# Patient Record
Sex: MALE | Race: ASIAN | NOT HISPANIC OR LATINO | URBAN - METROPOLITAN AREA
[De-identification: names, ages, dates, MRNs, and addresses within clinical notes are randomized per-mention and may not be internally consistent; named-entity substitution may affect disease eponyms.]

---

## 2024-09-04 ENCOUNTER — INPATIENT (INPATIENT)
Facility: HOSPITAL | Age: 64
LOS: 0 days | Discharge: LEFT BEFORE DISCHARGE | DRG: 316 | End: 2024-09-05
Attending: HOSPITALIST | Admitting: HOSPITALIST
Payer: COMMERCIAL

## 2024-09-04 VITALS
DIASTOLIC BLOOD PRESSURE: 81 MMHG | TEMPERATURE: 98 F | OXYGEN SATURATION: 100 % | HEART RATE: 70 BPM | SYSTOLIC BLOOD PRESSURE: 130 MMHG | HEIGHT: 71 IN | WEIGHT: 173.5 LBS | RESPIRATION RATE: 18 BRPM

## 2024-09-04 PROCEDURE — 99053 MED SERV 10PM-8AM 24 HR FAC: CPT

## 2024-09-04 PROCEDURE — 99285 EMERGENCY DEPT VISIT HI MDM: CPT

## 2024-09-04 NOTE — ED ADULT TRIAGE NOTE - CHIEF COMPLAINT QUOTE
pacemaker placed 6 years ago in [place]; comes in today with device protruding through skin left side of chest; pt noted today; no pain

## 2024-09-05 VITALS
TEMPERATURE: 98 F | OXYGEN SATURATION: 98 % | RESPIRATION RATE: 16 BRPM | DIASTOLIC BLOOD PRESSURE: 84 MMHG | SYSTOLIC BLOOD PRESSURE: 121 MMHG | HEART RATE: 56 BPM

## 2024-09-05 DIAGNOSIS — T82.9XXA UNSPECIFIED COMPLICATION OF CARDIAC AND VASCULAR PROSTHETIC DEVICE, IMPLANT AND GRAFT, INITIAL ENCOUNTER: ICD-10-CM

## 2024-09-05 LAB
ALBUMIN SERPL ELPH-MCNC: 4.4 G/DL — SIGNIFICANT CHANGE UP (ref 3.3–5)
ALP SERPL-CCNC: 76 U/L — SIGNIFICANT CHANGE UP (ref 40–120)
ALT FLD-CCNC: 16 U/L — SIGNIFICANT CHANGE UP (ref 10–45)
ANION GAP SERPL CALC-SCNC: 14 MMOL/L — SIGNIFICANT CHANGE UP (ref 5–17)
APTT BLD: 31.5 SEC — SIGNIFICANT CHANGE UP (ref 24.5–35.6)
AST SERPL-CCNC: 24 U/L — SIGNIFICANT CHANGE UP (ref 10–40)
BASOPHILS # BLD AUTO: 0.07 K/UL — SIGNIFICANT CHANGE UP (ref 0–0.2)
BASOPHILS NFR BLD AUTO: 0.8 % — SIGNIFICANT CHANGE UP (ref 0–2)
BILIRUB SERPL-MCNC: 0.3 MG/DL — SIGNIFICANT CHANGE UP (ref 0.2–1.2)
BLD GP AB SCN SERPL QL: NEGATIVE — SIGNIFICANT CHANGE UP
BUN SERPL-MCNC: 23 MG/DL — SIGNIFICANT CHANGE UP (ref 7–23)
CALCIUM SERPL-MCNC: 9.6 MG/DL — SIGNIFICANT CHANGE UP (ref 8.4–10.5)
CHLORIDE SERPL-SCNC: 104 MMOL/L — SIGNIFICANT CHANGE UP (ref 96–108)
CO2 SERPL-SCNC: 18 MMOL/L — LOW (ref 22–31)
CREAT SERPL-MCNC: 0.96 MG/DL — SIGNIFICANT CHANGE UP (ref 0.5–1.3)
EGFR: 89 ML/MIN/1.73M2 — SIGNIFICANT CHANGE UP
EOSINOPHIL # BLD AUTO: 0.23 K/UL — SIGNIFICANT CHANGE UP (ref 0–0.5)
EOSINOPHIL NFR BLD AUTO: 2.7 % — SIGNIFICANT CHANGE UP (ref 0–6)
GAS PNL BLDV: SIGNIFICANT CHANGE UP
GLUCOSE SERPL-MCNC: 119 MG/DL — HIGH (ref 70–99)
HCT VFR BLD CALC: 42.6 % — SIGNIFICANT CHANGE UP (ref 39–50)
HGB BLD-MCNC: 13.9 G/DL — SIGNIFICANT CHANGE UP (ref 13–17)
IMM GRANULOCYTES NFR BLD AUTO: 0.1 % — SIGNIFICANT CHANGE UP (ref 0–0.9)
INR BLD: 1.08 RATIO — SIGNIFICANT CHANGE UP (ref 0.85–1.18)
LYMPHOCYTES # BLD AUTO: 2.63 K/UL — SIGNIFICANT CHANGE UP (ref 1–3.3)
LYMPHOCYTES # BLD AUTO: 30.9 % — SIGNIFICANT CHANGE UP (ref 13–44)
MCHC RBC-ENTMCNC: 28.5 PG — SIGNIFICANT CHANGE UP (ref 27–34)
MCHC RBC-ENTMCNC: 32.6 GM/DL — SIGNIFICANT CHANGE UP (ref 32–36)
MCV RBC AUTO: 87.5 FL — SIGNIFICANT CHANGE UP (ref 80–100)
MONOCYTES # BLD AUTO: 1.13 K/UL — HIGH (ref 0–0.9)
MONOCYTES NFR BLD AUTO: 13.3 % — SIGNIFICANT CHANGE UP (ref 2–14)
NEUTROPHILS # BLD AUTO: 4.43 K/UL — SIGNIFICANT CHANGE UP (ref 1.8–7.4)
NEUTROPHILS NFR BLD AUTO: 52.2 % — SIGNIFICANT CHANGE UP (ref 43–77)
NRBC # BLD: 0 /100 WBCS — SIGNIFICANT CHANGE UP (ref 0–0)
PLATELET # BLD AUTO: 188 K/UL — SIGNIFICANT CHANGE UP (ref 150–400)
POTASSIUM SERPL-MCNC: 4.7 MMOL/L — SIGNIFICANT CHANGE UP (ref 3.5–5.3)
POTASSIUM SERPL-SCNC: 4.7 MMOL/L — SIGNIFICANT CHANGE UP (ref 3.5–5.3)
PROT SERPL-MCNC: 7.8 G/DL — SIGNIFICANT CHANGE UP (ref 6–8.3)
PROTHROM AB SERPL-ACNC: 11.3 SEC — SIGNIFICANT CHANGE UP (ref 9.5–13)
RBC # BLD: 4.87 M/UL — SIGNIFICANT CHANGE UP (ref 4.2–5.8)
RBC # FLD: 15.7 % — HIGH (ref 10.3–14.5)
RH IG SCN BLD-IMP: POSITIVE — SIGNIFICANT CHANGE UP
SODIUM SERPL-SCNC: 136 MMOL/L — SIGNIFICANT CHANGE UP (ref 135–145)
WBC # BLD: 8.5 K/UL — SIGNIFICANT CHANGE UP (ref 3.8–10.5)
WBC # FLD AUTO: 8.5 K/UL — SIGNIFICANT CHANGE UP (ref 3.8–10.5)

## 2024-09-05 PROCEDURE — 99285 EMERGENCY DEPT VISIT HI MDM: CPT

## 2024-09-05 PROCEDURE — 82435 ASSAY OF BLOOD CHLORIDE: CPT

## 2024-09-05 PROCEDURE — 85610 PROTHROMBIN TIME: CPT

## 2024-09-05 PROCEDURE — 71046 X-RAY EXAM CHEST 2 VIEWS: CPT

## 2024-09-05 PROCEDURE — 85025 COMPLETE CBC W/AUTO DIFF WBC: CPT

## 2024-09-05 PROCEDURE — 85014 HEMATOCRIT: CPT

## 2024-09-05 PROCEDURE — 85018 HEMOGLOBIN: CPT

## 2024-09-05 PROCEDURE — 85730 THROMBOPLASTIN TIME PARTIAL: CPT

## 2024-09-05 PROCEDURE — 87040 BLOOD CULTURE FOR BACTERIA: CPT

## 2024-09-05 PROCEDURE — 83605 ASSAY OF LACTIC ACID: CPT

## 2024-09-05 PROCEDURE — 80053 COMPREHEN METABOLIC PANEL: CPT

## 2024-09-05 PROCEDURE — 82947 ASSAY GLUCOSE BLOOD QUANT: CPT

## 2024-09-05 PROCEDURE — 86900 BLOOD TYPING SEROLOGIC ABO: CPT

## 2024-09-05 PROCEDURE — 84132 ASSAY OF SERUM POTASSIUM: CPT

## 2024-09-05 PROCEDURE — 82330 ASSAY OF CALCIUM: CPT

## 2024-09-05 PROCEDURE — 71046 X-RAY EXAM CHEST 2 VIEWS: CPT | Mod: 26

## 2024-09-05 PROCEDURE — 71260 CT THORAX DX C+: CPT | Mod: 26

## 2024-09-05 PROCEDURE — 84295 ASSAY OF SERUM SODIUM: CPT

## 2024-09-05 PROCEDURE — 71260 CT THORAX DX C+: CPT | Mod: MC

## 2024-09-05 PROCEDURE — 93005 ELECTROCARDIOGRAM TRACING: CPT

## 2024-09-05 PROCEDURE — 96374 THER/PROPH/DIAG INJ IV PUSH: CPT

## 2024-09-05 PROCEDURE — 86850 RBC ANTIBODY SCREEN: CPT

## 2024-09-05 PROCEDURE — 96375 TX/PRO/DX INJ NEW DRUG ADDON: CPT

## 2024-09-05 PROCEDURE — 82803 BLOOD GASES ANY COMBINATION: CPT

## 2024-09-05 PROCEDURE — 86901 BLOOD TYPING SEROLOGIC RH(D): CPT

## 2024-09-05 RX ORDER — DOXYCYCLINE MONOHYDRATE 100 MG
1 TABLET ORAL
Qty: 42 | Refills: 0
Start: 2024-09-05 | End: 2024-09-25

## 2024-09-05 RX ORDER — CLINDAMYCIN PHOSPHATE 150 MG/ML
1 VIAL (ML) INJECTION
Qty: 42 | Refills: 0
Start: 2024-09-05 | End: 2024-09-25

## 2024-09-05 RX ORDER — VANCOMYCIN/0.9 % SOD CHLORIDE 1.75G/25
1000 PLASTIC BAG, INJECTION (ML) INTRAVENOUS ONCE
Refills: 0 | Status: COMPLETED | OUTPATIENT
Start: 2024-09-05 | End: 2024-09-05

## 2024-09-05 RX ORDER — PIPERACILLIN SODIUM AND TAZOBACTAM SODIUM 3; .375 G/15ML; G/15ML
3.38 INJECTION, POWDER, FOR SOLUTION INTRAVENOUS ONCE
Refills: 0 | Status: COMPLETED | OUTPATIENT
Start: 2024-09-05 | End: 2024-09-05

## 2024-09-05 RX ADMIN — PIPERACILLIN SODIUM AND TAZOBACTAM SODIUM 200 GRAM(S): 3; .375 INJECTION, POWDER, FOR SOLUTION INTRAVENOUS at 02:47

## 2024-09-05 RX ADMIN — Medication 250 MILLIGRAM(S): at 03:25

## 2024-09-05 NOTE — CONSULT NOTE ADULT - SUBJECTIVE AND OBJECTIVE BOX
Cardiology Consult Note   [Please check [email or web address] password: [email or web address]" for cardiology service schedule and contact information]    HPI:    Mr. Winkler is a  64-year-old male past medical history CHF, cardiomegaly, CAD, ICD, GERD, hyperlipidemia (visiting from [facility], with friend "Mary" at bedside providing translation assistance per patient request, declined free tele-interpretation services and notes he understands some English) presents complaining of pacemaker eroding through chest wall which he first noticed yesterday. He denies any pain, fever, chills. He notes he contacted his surgeon in [facility] and is scheduled to fly out tomorrow for a revision and possible exchange of the pacemaker with a smaller pacemaker.  Patient denies any Nausea, vomiting, chest pain, diarrhea, constipation, painful urination, new rashes.     in ED, P 70, /81, RR 18, SPO2 100% on RA. started on vanc and zosyn.     PAST MEDICAL & SURGICAL HISTORY:  Pacemaker        FAMILY HISTORY:    SOCIAL HISTORY:  unchanged    MEDICATIONS:    vancomycin  IVPB. 1000 milliGRAM(s) IV Intermittent once                  -------------------------------------------------------------------------------------------  PHYSICAL EXAM:  T(C): 36.7 (09-05-24 @ 01:58), Max: 36.8 (09-04-24 @ 23:56)  HR: 64 (09-05-24 @ 01:58) (64 - 70)  BP: 113/74 (09-05-24 @ 01:58) (113/74 - 130/81)  RR: 20 (09-05-24 @ 01:58) (18 - 20)  SpO2: 100% (09-05-24 @ 01:58) (100% - 100%)  Wt(kg): --  I&O's Summary      GENERAL: NAD  HEAD: Atraumatic, Normocephalic.  ENT: Moist mucous membranes.  NECK: Supple, No JVD.  CHEST/LUNG: Clear to auscultation bilaterally; No rales, rhonchi, wheezing, or rubs. Unlabored respirations.  HEART: Regular rate and rhythm; No murmurs, rubs, or gallops.  ABDOMEN: Bowel sounds present; Soft, Nontender, Nondistended.   EXTREMITIES:  2+ Peripheral Pulses, brisk capillary refill. No clubbing, cyanosis, or edema.    -------------------------------------------------------------------------------------------  LABS:                          13.9   8.50  )-----------( 188      ( 05 Sep 2024 02:42 )             42.6                 vancomycin  IVPB. 1000 milliGRAM(s) IV Intermittent once                             Cardiology Consult Note   [Please check [email or web address] password: [email or web address]" for cardiology service schedule and contact information]    HPI:    Mr. Winkler is a  64-year-old male past medical history CHF (s/p PPM and ICD placed in 2018), cardiomegaly, CAD (per pt s/p 6-7 stents, last in 10/2023), ICD, GERD, hyperlipidemia, who presents due to pacemaker eroding through chest wall    Pt gets all of his medical care in [facility]. pt is here visiting, and made an appointment to see his EP doctor in [facility] for exchange of his pacemaker. His flight is 9/6 at 2am, and was told by his doctor to come here to get antibiotics beforehand. He said everything was normal until yesterday when he noticed his pacemaker eroding through his chest wall.     no fever/chills/cp/sob/pnd/orthopnea/lower extremity swelling.    in ED, P 70, /81, RR 18, SPO2 100% on RA. bmp wnl.  EKG a-s, v-p. started on vanc and zosyn.     medications:  plavix 75 mg qd  isosorbide nitrate 5 mg tid  xarelto 15 mg qd  zetia 10 mg qd  metop er 95 mg qd  entresto bid  amlodipine 5 mg qd  spironolactone 20 mg qd  rosuvastatin 10 mg qd  trimetazidine hydrochloride 35 mg bid      PAST MEDICAL & SURGICAL HISTORY:  Pacemaker        FAMILY HISTORY:    SOCIAL HISTORY:  unchanged    MEDICATIONS:    vancomycin  IVPB. 1000 milliGRAM(s) IV Intermittent once                  -------------------------------------------------------------------------------------------  PHYSICAL EXAM:  T(C): 36.7 (09-05-24 @ 01:58), Max: 36.8 (09-04-24 @ 23:56)  HR: 64 (09-05-24 @ 01:58) (64 - 70)  BP: 113/74 (09-05-24 @ 01:58) (113/74 - 130/81)  RR: 20 (09-05-24 @ 01:58) (18 - 20)  SpO2: 100% (09-05-24 @ 01:58) (100% - 100%)  Wt(kg): --  I&O's Summary      GENERAL: NAD  HEAD: Atraumatic, Normocephalic.  ENT: Moist mucous membranes.  NECK: Supple, No JVD.  CHEST/LUNG: Clear to auscultation bilaterally; No rales, rhonchi, wheezing, or rubs. Unlabored respirations.  HEART: Regular rate and rhythm; No murmurs, rubs, or gallops.  ABDOMEN: Bowel sounds present; Soft, Nontender, Nondistended.   EXTREMITIES:  2+ Peripheral Pulses, brisk capillary refill. No clubbing, cyanosis, or edema.  Skin: pacemaker eroding through upper L chest with surrounding erythema    -------------------------------------------------------------------------------------------  LABS:                          13.9   8.50  )-----------( 188      ( 05 Sep 2024 02:42 )             42.6                 vancomycin  IVPB. 1000 milliGRAM(s) IV Intermittent once

## 2024-09-05 NOTE — ED ADULT NURSE NOTE - OBJECTIVE STATEMENT
patient is a 63 y/o M with hx s/p pacemaker placement in [place] 6 years ago who presents to the ED via triage c/o pacemaker protrusion. patient states that since yesterday he noticed that his pacemaker is protruding through the skin and is having pain with palpation. patient states "I think Its because I sleep on my left side a lot". pacemaker with swelling and redness around the perimeter. patient is a&ox4, PERRL. respirations even and unlabored. abdomen soft, nondistended. denies fevers/chills, numbness/tingling, weakness, headache, dizziness, vision changes, cp, sob, cough, abd pain, n/v/d, dysuria, hematuria, bloody stools, back pain. pending MD avendano. comfort and safety maintained

## 2024-09-05 NOTE — ED PROVIDER NOTE - PHYSICAL EXAMINATION
GENERAL: NAD  HEENT:  Atraumatic  CHEST/LUNG: +Pacemaker extrusion through L chest wall with visible silver metal, mild surrounding erythema without discharge. Chest rise equal bilaterally no w/r/r  HEART: Regular rate and rhythm normal s1/s2 no m/r/g  ABDOMEN: Soft, Nontender, Nondistended  EXTREMITIES:  Extremities warm  PSYCH: A&Ox3  SKIN: See chest above, otherwise No obvious rashes or lesions  MSK: No cervical spine TTP, able to range neck to the left and right/ No midline spinal TTP/ No swelling, redness, pain or discharge from   NEUROLOGY: strength and sensation intact in all extremities. Ambulatory without difficulty.

## 2024-09-05 NOTE — ED PROVIDER NOTE - PATIENT PORTAL LINK FT
You can access the FollowMyHealth Patient Portal offered by [facility] by registering at the following website: [email or web address]. By joining [employer]’s FollowMyHealth portal, you will also be able to view your health information using other applications (apps) compatible with our system.

## 2024-09-05 NOTE — ED PROVIDER NOTE - ATTENDING CONTRIBUTION TO CARE
Declan Oleary DO: I have personally performed a face to face medical and diagnostic evaluation of the patient. I have discussed with and reviewed the Resident's and/or ACP's and/or Medical/PA/NP student's note and agree with the History, ROS, Physical Exam and MDM unless otherwise indicated. A brief summary of my personal evaluation and impression can be found below.     HPI above    CONSTITUTIONAL: NAD  SKIN: Left-sided chest wall has pacemaker with visible left corner which has eroded through skin.  No surrounding erythema or fluctuance.  HEAD: NCAT  EYES: EOMI, PERRLA, no scleral icterus, conjunctiva pink  NECK: Supple; non tender. Full ROM.  CARD: RRR  RESP: No respiratory distress  ABD: non-distended  MSK: Full ROM, no leg swelling  PSYCH: Cooperative, appropriate.    Patient is exposed pacemaker, high concern for pacemaker malfunction and bloodstream infection, will send off cultures, gave empiric antibiotics get cardiology consult and patient will need admission for explantation of pacemaker.  Patient states this was placed in [facility] and has an appointment to return however given level of erosion, recommending admission at this time.  Patient has no chest pain and no other symptoms at this time, patient's pacemaker appears to be working as pacing spikes are seen on monitor, however at this time patient is at high risk for pacemaker malfunction.

## 2024-09-05 NOTE — CONSULT NOTE ADULT - ASSESSMENT
Mr. Winkler is a  64-year-old male past medical history CHF (s/p PPM and ICD placed in 2018), cardiomegaly, CAD (per pt s/p 6-7 stents, last in 10/2023), ICD, GERD, hyperlipidemia, who presents due to pacemaker eroding through chest wall    #device eroding through chest wall  #CHF (likely ICM, pt s/p 6 stents)  likely pocket infection, unclear chronicity since pt only noticed starting 9/4 but seems like it's been going on for longer. EKG 9/4 showing a-s, v-p. pt has never felt any shocks. pt euvolemic at this point, no pnd/orthopnea/lower extremity edema. no echo or LHC on file. pt needs device extraction, but patient wanting to go back to [place] to get it done on 9/6  -continue vanc/zosyn  -get CXR  -EP to interrogate device in the AM Mr. Winkler is a  64-year-old male past medical history CHF (s/p PPM and ICD placed in 2018), cardiomegaly, CAD (per pt s/p 6-7 stents, last in 10/2023), ICD, GERD, hyperlipidemia, who presents due to pacemaker eroding through chest wall    #device eroding through chest wall  #CHF (likely ICM, pt s/p 6 stents)  likely pocket infection, unclear chronicity since pt only noticed starting 9/4 but seems like it's been going on for longer. EKG 9/4 showing a-s, v-p. pt has never felt any shocks. pt euvolemic at this point, no pnd/orthopnea/lower extremity edema. no echo or LHC on file. pt needs device extraction, but patient wanting to go back to [place] to get it done on 9/6  -continue vanc/zosyn  -get CXR  -monitor on tele  -EP to interrogate device in the AM

## 2024-09-05 NOTE — ED PROVIDER NOTE - NSFOLLOWUPINSTRUCTIONS_ED_ALL_ED_FT
You were seen in the hospital for your pacemaker protruding through your chest wall.  This is of high concern for infection causing this.  We recommended that you stay in the hospital to have a electrophysiology surgeon evaluate you and consider replacing it here.  You declined to stay in the hospital and states that you would like to go home back to [facility] tomorrow to have your surgeon replace the device. You may return here at any time. Please take the antibiotics sent to the pharmacy.

## 2024-09-05 NOTE — ED PROVIDER NOTE - PROGRESS NOTE DETAILS
Norman Escobar DO (PGY-2) Cards recommending admission for IVabx and EP attg to evaluate for possible extraction. Endorsed to Dr. Oconnell. Declan Oleary, DO: Had extensive conversation with patient after pending admission through, patient is fully aware of potential risks including disability and death especially with patient's very high risk for bacteremia with exposed pacemaker.  Patient understand risks reiterated that he has an appointment [place] for pacemaker placement, explained to patient about very high risk of travel with current condition, patient states he feels well understands all the risks and insists that he must not be admitted.  Likely patient may be leaving is my medical advice which he agrees to, reiterated return precautions which he also agrees to.  Will send home with p.o. antibiotics which patient will  today.  Patient understands that he can return if he starts to feel any symptoms. Norman Escobar DO (PGY-2) The patient wishes to be discharged against medical advice. I have assessed the patient's mental status and  the patient has capacity to make this decision. I have explained the risks of leaving without full treatment, including severe disability and death, which the patient understands and is willing to accept. I have answered all of the patient's questions. I reiterated my medical opinion and advised the patient to return at any time. We discussed the further workup outside of the current visit and return precautions.   AMA form completed. I sent antibiotics to his pharmacy to take at home.  Language line  [phone]

## 2024-09-05 NOTE — ED ADULT NURSE NOTE - NS ED NURSE DISCH DISPOSITION
87-year-old male presents to the ED complaining of rectal pain.  No fevers or chills.  No nausea or vomiting.  Last bowel movement was yesterday was normal.   Has a suprapubic catheter.  Denies chest pain cough shortness of breath.  Pain is worse when he is having a bowel movement.  Has chronic constipation.  On Metamucil and MiraLAX.
AMA (saw a physician/midlevel provider and clinician was able to provide reasons for staying for treatment & form is signed)

## 2024-09-05 NOTE — ED PROVIDER NOTE - CLINICAL SUMMARY MEDICAL DECISION MAKING FREE TEXT BOX
Patient is a 64-year-old male past medical history CHF, cardiomegaly, CAD, ICD, GERD, hyperlipidemia (visiting from [facility], with friend "Mary" at bedside providing translation assistance per patient request, declined free tele-interpretation services and notes he understands some English) presents complaining of pacemaker eroding through chest wall which he first noticed yesterday. He denies any pain, fever, chills. He notes he contacted his surgeon in [facility] and is scheduled to fly out tomorrow for a revision and possible exchange of the pacemaker with a smaller pacemaker.  Patient denies any Nausea, vomiting, chest pain, diarrhea, constipation, painful urination, new rashes.   VS non actionable  Ddx- pacemaker extrusion. There is concern for possible infection given surrounding erythema and sudden extrusion. Patient is a 64-year-old male past medical history CHF, cardiomegaly, CAD, ICD (2018), GERD, hyperlipidemia (visiting from [facility], with friend "Mary" at bedside providing translation assistance per patient request, declined free tele-interpretation services and notes he understands some English) presents complaining of pacemaker eroding through chest wall which he first noticed yesterday. He denies any pain, fever, chills. He notes he contacted his surgeon in [facility] and is scheduled to fly out tomorrow for a revision and possible exchange of the pacemaker with a smaller pacemaker.  Patient denies any Nausea, vomiting, chest pain, diarrhea, constipation, painful urination, new rashes.   VS non actionable  Ddx- pacemaker extrusion. There is concern for possible infection given surrounding erythema and sudden extrusion. Will give abx, labs, cards consult

## 2024-09-10 LAB
CULTURE RESULTS: SIGNIFICANT CHANGE UP
SPECIMEN SOURCE: SIGNIFICANT CHANGE UP